# Patient Record
Sex: MALE | Race: WHITE | NOT HISPANIC OR LATINO | Employment: FULL TIME | URBAN - METROPOLITAN AREA
[De-identification: names, ages, dates, MRNs, and addresses within clinical notes are randomized per-mention and may not be internally consistent; named-entity substitution may affect disease eponyms.]

---

## 2024-05-15 ENCOUNTER — OFFICE VISIT (OUTPATIENT)
Dept: FAMILY MEDICINE CLINIC | Facility: CLINIC | Age: 39
End: 2024-05-15
Payer: MEDICAID

## 2024-05-15 VITALS
OXYGEN SATURATION: 98 % | TEMPERATURE: 98.6 F | DIASTOLIC BLOOD PRESSURE: 80 MMHG | HEIGHT: 74 IN | WEIGHT: 209.6 LBS | BODY MASS INDEX: 26.9 KG/M2 | HEART RATE: 82 BPM | SYSTOLIC BLOOD PRESSURE: 120 MMHG

## 2024-05-15 DIAGNOSIS — M79.672 LEFT FOOT PAIN: Primary | ICD-10-CM

## 2024-05-15 DIAGNOSIS — E66.3 OVERWEIGHT (BMI 25.0-29.9): ICD-10-CM

## 2024-05-15 DIAGNOSIS — Z76.89 ENCOUNTER TO ESTABLISH CARE WITH NEW DOCTOR: ICD-10-CM

## 2024-05-15 DIAGNOSIS — K31.7 GASTRIC POLYP: ICD-10-CM

## 2024-05-15 DIAGNOSIS — M25.532 PAIN IN BOTH WRISTS: ICD-10-CM

## 2024-05-15 DIAGNOSIS — Z13.220 SCREENING FOR HYPERLIPIDEMIA: ICD-10-CM

## 2024-05-15 DIAGNOSIS — M25.531 PAIN IN BOTH WRISTS: ICD-10-CM

## 2024-05-15 DIAGNOSIS — K21.9 GASTROESOPHAGEAL REFLUX DISEASE, UNSPECIFIED WHETHER ESOPHAGITIS PRESENT: ICD-10-CM

## 2024-05-15 PROCEDURE — 1160F RVW MEDS BY RX/DR IN RCRD: CPT | Performed by: FAMILY MEDICINE

## 2024-05-15 PROCEDURE — 1159F MED LIST DOCD IN RCRD: CPT | Performed by: FAMILY MEDICINE

## 2024-05-15 PROCEDURE — 1126F AMNT PAIN NOTED NONE PRSNT: CPT | Performed by: FAMILY MEDICINE

## 2024-05-15 PROCEDURE — 99204 OFFICE O/P NEW MOD 45 MIN: CPT | Performed by: FAMILY MEDICINE

## 2024-05-15 RX ORDER — IBUPROFEN 600 MG/1
600 TABLET ORAL 2 TIMES DAILY PRN
Qty: 45 TABLET | Refills: 0 | Status: SHIPPED | OUTPATIENT
Start: 2024-05-15
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
## 2024-06-18 ENCOUNTER — LAB (OUTPATIENT)
Dept: LAB | Facility: HOSPITAL | Age: 39
End: 2024-06-18
Payer: MEDICAID

## 2024-06-18 ENCOUNTER — OFFICE VISIT (OUTPATIENT)
Dept: FAMILY MEDICINE CLINIC | Facility: CLINIC | Age: 39
End: 2024-06-18
Payer: MEDICAID

## 2024-06-18 ENCOUNTER — TELEPHONE (OUTPATIENT)
Dept: GASTROENTEROLOGY | Facility: CLINIC | Age: 39
End: 2024-06-18

## 2024-06-18 VITALS
HEART RATE: 88 BPM | TEMPERATURE: 97.7 F | BODY MASS INDEX: 26.9 KG/M2 | HEIGHT: 74 IN | OXYGEN SATURATION: 98 % | DIASTOLIC BLOOD PRESSURE: 78 MMHG | WEIGHT: 209.6 LBS | SYSTOLIC BLOOD PRESSURE: 118 MMHG

## 2024-06-18 DIAGNOSIS — K21.9 GASTROESOPHAGEAL REFLUX DISEASE, UNSPECIFIED WHETHER ESOPHAGITIS PRESENT: ICD-10-CM

## 2024-06-18 DIAGNOSIS — Z13.220 SCREENING FOR HYPERLIPIDEMIA: ICD-10-CM

## 2024-06-18 DIAGNOSIS — Z76.89 ENCOUNTER TO ESTABLISH CARE WITH NEW DOCTOR: ICD-10-CM

## 2024-06-18 DIAGNOSIS — K31.7 GASTRIC POLYP: Primary | ICD-10-CM

## 2024-06-18 LAB
ALBUMIN SERPL-MCNC: 4.5 G/DL (ref 3.5–5.2)
ALBUMIN/GLOB SERPL: 1.5 G/DL
ALP SERPL-CCNC: 93 U/L (ref 39–117)
ALT SERPL W P-5'-P-CCNC: 37 U/L (ref 1–41)
ANION GAP SERPL CALCULATED.3IONS-SCNC: 9 MMOL/L (ref 5–15)
AST SERPL-CCNC: 19 U/L (ref 1–40)
BASOPHILS # BLD AUTO: 0.17 10*3/MM3 (ref 0–0.2)
BASOPHILS NFR BLD AUTO: 1.2 % (ref 0–1.5)
BILIRUB SERPL-MCNC: 0.4 MG/DL (ref 0–1.2)
BUN SERPL-MCNC: 19 MG/DL (ref 6–20)
BUN/CREAT SERPL: 21.1 (ref 7–25)
CALCIUM SPEC-SCNC: 9.5 MG/DL (ref 8.6–10.5)
CHLORIDE SERPL-SCNC: 104 MMOL/L (ref 98–107)
CHOLEST SERPL-MCNC: 173 MG/DL (ref 0–200)
CO2 SERPL-SCNC: 24 MMOL/L (ref 22–29)
CREAT SERPL-MCNC: 0.9 MG/DL (ref 0.76–1.27)
DEPRECATED RDW RBC AUTO: 38.9 FL (ref 37–54)
EGFRCR SERPLBLD CKD-EPI 2021: 112.1 ML/MIN/1.73
EOSINOPHIL # BLD AUTO: 0.48 10*3/MM3 (ref 0–0.4)
EOSINOPHIL NFR BLD AUTO: 3.5 % (ref 0.3–6.2)
ERYTHROCYTE [DISTWIDTH] IN BLOOD BY AUTOMATED COUNT: 11.7 % (ref 12.3–15.4)
GLOBULIN UR ELPH-MCNC: 3 GM/DL
GLUCOSE SERPL-MCNC: 91 MG/DL (ref 65–99)
HCT VFR BLD AUTO: 43.6 % (ref 37.5–51)
HDLC SERPL-MCNC: 22 MG/DL (ref 40–60)
HGB BLD-MCNC: 14.6 G/DL (ref 13–17.7)
IMM GRANULOCYTES # BLD AUTO: 0.49 10*3/MM3 (ref 0–0.05)
IMM GRANULOCYTES NFR BLD AUTO: 3.6 % (ref 0–0.5)
LDLC SERPL CALC-MCNC: 125 MG/DL (ref 0–100)
LDLC/HDLC SERPL: 5.55 {RATIO}
LYMPHOCYTES # BLD AUTO: 2.66 10*3/MM3 (ref 0.7–3.1)
LYMPHOCYTES NFR BLD AUTO: 19.4 % (ref 19.6–45.3)
MCH RBC QN AUTO: 30.2 PG (ref 26.6–33)
MCHC RBC AUTO-ENTMCNC: 33.5 G/DL (ref 31.5–35.7)
MCV RBC AUTO: 90.1 FL (ref 79–97)
MONOCYTES # BLD AUTO: 0.93 10*3/MM3 (ref 0.1–0.9)
MONOCYTES NFR BLD AUTO: 6.8 % (ref 5–12)
NEUTROPHILS NFR BLD AUTO: 65.5 % (ref 42.7–76)
NEUTROPHILS NFR BLD AUTO: 9.01 10*3/MM3 (ref 1.7–7)
NRBC BLD AUTO-RTO: 0 /100 WBC (ref 0–0.2)
PLATELET # BLD AUTO: 400 10*3/MM3 (ref 140–450)
PMV BLD AUTO: 9 FL (ref 6–12)
POTASSIUM SERPL-SCNC: 4.4 MMOL/L (ref 3.5–5.2)
PROT SERPL-MCNC: 7.5 G/DL (ref 6–8.5)
RBC # BLD AUTO: 4.84 10*6/MM3 (ref 4.14–5.8)
SODIUM SERPL-SCNC: 137 MMOL/L (ref 136–145)
TRIGL SERPL-MCNC: 144 MG/DL (ref 0–150)
VLDLC SERPL-MCNC: 26 MG/DL (ref 5–40)
WBC NRBC COR # BLD AUTO: 13.74 10*3/MM3 (ref 3.4–10.8)

## 2024-06-18 PROCEDURE — 1160F RVW MEDS BY RX/DR IN RCRD: CPT | Performed by: FAMILY MEDICINE

## 2024-06-18 PROCEDURE — 85025 COMPLETE CBC W/AUTO DIFF WBC: CPT

## 2024-06-18 PROCEDURE — 80061 LIPID PANEL: CPT

## 2024-06-18 PROCEDURE — 99213 OFFICE O/P EST LOW 20 MIN: CPT | Performed by: FAMILY MEDICINE

## 2024-06-18 PROCEDURE — 80053 COMPREHEN METABOLIC PANEL: CPT

## 2024-06-18 PROCEDURE — 1126F AMNT PAIN NOTED NONE PRSNT: CPT | Performed by: FAMILY MEDICINE

## 2024-06-18 PROCEDURE — 1159F MED LIST DOCD IN RCRD: CPT | Performed by: FAMILY MEDICINE

## 2024-06-18 NOTE — TELEPHONE ENCOUNTER
This patient requires an  for their appointment. Please see the  information below.     Caller: DOMO BARKSDALE  Relationship to patient: SELF  Best call back number: [phone]    Service:IPAD  Is  needs updated in registration: YES  Date of Appointment:06/21/2024  Time of Appointment:1:15  Additional notes:Swiss
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
## 2025-01-24 ENCOUNTER — OFFICE VISIT (OUTPATIENT)
Dept: FAMILY MEDICINE CLINIC | Facility: CLINIC | Age: 40
End: 2025-01-24
Payer: MEDICAID

## 2025-01-24 VITALS
OXYGEN SATURATION: 98 % | WEIGHT: 206.8 LBS | HEART RATE: 67 BPM | DIASTOLIC BLOOD PRESSURE: 86 MMHG | BODY MASS INDEX: 26.54 KG/M2 | TEMPERATURE: 98.4 F | HEIGHT: 74 IN | RESPIRATION RATE: 20 BRPM | SYSTOLIC BLOOD PRESSURE: 130 MMHG

## 2025-01-24 DIAGNOSIS — K21.9 GASTROESOPHAGEAL REFLUX DISEASE, UNSPECIFIED WHETHER ESOPHAGITIS PRESENT: ICD-10-CM

## 2025-01-24 DIAGNOSIS — K31.7 GASTRIC POLYP: Primary | ICD-10-CM

## 2025-01-24 PROCEDURE — 1159F MED LIST DOCD IN RCRD: CPT | Performed by: FAMILY MEDICINE

## 2025-01-24 PROCEDURE — 1160F RVW MEDS BY RX/DR IN RCRD: CPT | Performed by: FAMILY MEDICINE

## 2025-01-24 PROCEDURE — 99213 OFFICE O/P EST LOW 20 MIN: CPT | Performed by: FAMILY MEDICINE

## 2025-01-24 PROCEDURE — 1126F AMNT PAIN NOTED NONE PRSNT: CPT | Performed by: FAMILY MEDICINE

## 2025-01-24 NOTE — PROGRESS NOTES
"    Follow Up Office Visit      Date: 2025   Patient Name: Miriam Cornell  : 1985   MRN: [id number]     Chief Complaint:    Chief Complaint   Patient presents with    Heartburn     Heaviness after eating missing Gi appointment       History of Present Illness: Miriam Cornell is a 40 y.o. male who presents today scheduled for stomach area pain.      Lee Patel [id number]    Previously reported gastric polyp that they were monitoring.  Reported he was having yearly scopes in [place].    Reports he takes otc med for reflux and reports the medication helps.    Patient was previously referred to gastroenterology  Reports he missed appt with GI provider.      Reports he takes baking soda and water for his reflux and reports it helps      Subjective      Review of Systems:   Review of Systems   Constitutional:  Negative for chills and fever.   Gastrointestinal:  Negative for nausea and vomiting.       I have reviewed the patients family history, social history, past medical history, past surgical history and have updated it as appropriate.     Medications:     Current Outpatient Medications:     ibuprofen (ADVIL,MOTRIN) 600 MG tablet, Take 1 tablet by mouth 2 (Two) Times a Day As Needed for Mild Pain or Moderate Pain. (Patient not taking: Reported on 2025), Disp: 45 tablet, Rfl: 0    Allergies:   No Known Allergies    Objective     Physical Exam: Please see above  Vital Signs:   Vitals:    25 1043   BP: 130/86   BP Location: Left arm   Patient Position: Sitting   Cuff Size: Large Adult   Pulse: 67   Resp: 20   Temp: 98.4 °F (36.9 °C)   TempSrc: Temporal   SpO2: 98%   Weight: 93.8 kg (206 lb 12.8 oz)   Height: 187 cm (73.62\")   PainSc: 0-No pain     Body mass index is 26.82 kg/m².          Physical Exam  Vitals and nursing note reviewed.   Constitutional:       Appearance: Normal appearance.   HENT:      Head: Normocephalic and atraumatic.   Neck:      Vascular: No carotid bruit. " "  Cardiovascular:      Rate and Rhythm: Normal rate and regular rhythm.      Heart sounds: Normal heart sounds. No murmur heard.  Pulmonary:      Effort: Pulmonary effort is normal.      Breath sounds: Normal breath sounds.   Abdominal:      General: Bowel sounds are normal.      Palpations: Abdomen is soft. There is no mass.      Tenderness: There is no abdominal tenderness.   Musculoskeletal:      Right lower leg: No edema.      Left lower leg: No edema.   Skin:     Coloration: Skin is not jaundiced or pale.      Findings: No erythema.   Neurological:      Mental Status: He is alert. Mental status is at baseline.   Psychiatric:         Mood and Affect: Mood normal.         Behavior: Behavior normal.         Procedures    Results:   Labs:   No results found for: \"HGBA1C\", \"CMP\", \"CBCDIFFPANEL\", \"CREAT\", \"TSH\"     POCT Results (if applicable):   Results for orders placed or performed in visit on 06/18/24   Comprehensive Metabolic Panel    Collection Time: 06/18/24  9:34 AM    Specimen: Blood   Result Value Ref Range    Glucose 91 65 - 99 mg/dL    BUN 19 6 - 20 mg/dL    Creatinine 0.90 0.76 - 1.27 mg/dL    Sodium 137 136 - 145 mmol/L    Potassium 4.4 3.5 - 5.2 mmol/L    Chloride 104 98 - 107 mmol/L    CO2 24.0 22.0 - 29.0 mmol/L    Calcium 9.5 8.6 - 10.5 mg/dL    Total Protein 7.5 6.0 - 8.5 g/dL    Albumin 4.5 3.5 - 5.2 g/dL    ALT (SGPT) 37 1 - 41 U/L    AST (SGOT) 19 1 - 40 U/L    Alkaline Phosphatase 93 39 - 117 U/L    Total Bilirubin 0.4 0.0 - 1.2 mg/dL    Globulin 3.0 gm/dL    A/G Ratio 1.5 g/dL    BUN/Creatinine Ratio 21.1 7.0 - 25.0    Anion Gap 9.0 5.0 - 15.0 mmol/L    eGFR 112.1 >60.0 mL/min/1.73   Lipid Panel    Collection Time: 06/18/24  9:34 AM    Specimen: Blood   Result Value Ref Range    Total Cholesterol 173 0 - 200 mg/dL    Triglycerides 144 0 - 150 mg/dL    HDL Cholesterol 22 (L) 40 - 60 mg/dL    LDL Cholesterol  125 (H) 0 - 100 mg/dL    VLDL Cholesterol 26 5 - 40 mg/dL    LDL/HDL Ratio 5.55    CBC " Auto Differential    Collection Time: 06/18/24  9:34 AM    Specimen: Blood   Result Value Ref Range    WBC 13.74 (H) 3.40 - 10.80 10*3/mm3    RBC 4.84 4.14 - 5.80 10*6/mm3    Hemoglobin 14.6 13.0 - 17.7 g/dL    Hematocrit 43.6 37.5 - 51.0 %    MCV 90.1 79.0 - 97.0 fL    MCH 30.2 26.6 - 33.0 pg    MCHC 33.5 31.5 - 35.7 g/dL    RDW 11.7 (L) 12.3 - 15.4 %    RDW-SD 38.9 37.0 - 54.0 fl    MPV 9.0 6.0 - 12.0 fL    Platelets 400 140 - 450 10*3/mm3    Neutrophil % 65.5 42.7 - 76.0 %    Lymphocyte % 19.4 (L) 19.6 - 45.3 %    Monocyte % 6.8 5.0 - 12.0 %    Eosinophil % 3.5 0.3 - 6.2 %    Basophil % 1.2 0.0 - 1.5 %    Immature Grans % 3.6 (H) 0.0 - 0.5 %    Neutrophils, Absolute 9.01 (H) 1.70 - 7.00 10*3/mm3    Lymphocytes, Absolute 2.66 0.70 - 3.10 10*3/mm3    Monocytes, Absolute 0.93 (H) 0.10 - 0.90 10*3/mm3    Eosinophils, Absolute 0.48 (H) 0.00 - 0.40 10*3/mm3    Basophils, Absolute 0.17 0.00 - 0.20 10*3/mm3    Immature Grans, Absolute 0.49 (H) 0.00 - 0.05 10*3/mm3    nRBC 0.0 0.0 - 0.2 /100 WBC       PHQ-9: PHQ-9 Total Score:       Imaging:   No valid procedures specified.         Assessment / Plan      Assessment/Plan:     Patient to with gastroenterology.  With Gastroenterology.  Missed his appt.  Discussed with patient to check with  about rescheduling his appt.        1. Gastric polyp  Patient to reschedule with gastroenterology.    2. Gastroesophageal reflux disease, unspecified whether esophagitis present  Discussed taking over-the-counter medication such as Tums, or Pepcid for reflux symptoms.  Patient to schedule/reschedule with gastroenterology.        Part of this note may be an electronic transcription/translation of spoken language to printed text using the Dragon Dictation System.      Vaccine Counseling:      Follow Up:   Return in about 6 months (around 7/24/2025).      DO JAMES Rodriguez

## 2025-03-10 ENCOUNTER — OFFICE VISIT (OUTPATIENT)
Dept: GASTROENTEROLOGY | Facility: CLINIC | Age: 40
End: 2025-03-10
Payer: MEDICAID

## 2025-03-10 VITALS
HEART RATE: 73 BPM | SYSTOLIC BLOOD PRESSURE: 140 MMHG | OXYGEN SATURATION: 97 % | HEIGHT: 71 IN | BODY MASS INDEX: 29.4 KG/M2 | WEIGHT: 210 LBS | DIASTOLIC BLOOD PRESSURE: 80 MMHG

## 2025-03-10 DIAGNOSIS — R10.9 ABDOMINAL DISCOMFORT: ICD-10-CM

## 2025-03-10 DIAGNOSIS — K21.9 GASTROESOPHAGEAL REFLUX DISEASE, UNSPECIFIED WHETHER ESOPHAGITIS PRESENT: Primary | ICD-10-CM

## 2025-03-10 DIAGNOSIS — K59.04 CHRONIC IDIOPATHIC CONSTIPATION: ICD-10-CM

## 2025-03-10 PROCEDURE — 1160F RVW MEDS BY RX/DR IN RCRD: CPT | Performed by: NURSE PRACTITIONER

## 2025-03-10 PROCEDURE — 1159F MED LIST DOCD IN RCRD: CPT | Performed by: NURSE PRACTITIONER

## 2025-03-10 PROCEDURE — 99204 OFFICE O/P NEW MOD 45 MIN: CPT | Performed by: NURSE PRACTITIONER

## 2025-03-10 RX ORDER — POLYETHYLENE GLYCOL 3350 17 G/17G
17 POWDER, FOR SOLUTION ORAL DAILY
Qty: 510 G | Refills: 11 | Status: SHIPPED | OUTPATIENT
Start: 2025-03-10

## 2025-03-10 RX ORDER — OMEPRAZOLE 20 MG/1
20 CAPSULE, DELAYED RELEASE ORAL DAILY
Qty: 90 CAPSULE | Refills: 3 | Status: SHIPPED | OUTPATIENT
Start: 2025-03-10

## 2025-03-10 NOTE — PROGRESS NOTES
GASTROENTEROLOGY OFFICE NOTE    Miriam Cornell  [id number]  1985    CARE TEAM  Patient Care Team:  Vijay Ozuna DO as PCP - General (Family Medicine)    Referring Provider: No ref. provider found    Chief Complaint   Patient presents with    Heartburn     New patient    Gastric Polyp     New patient        HISTORY OF PRESENT ILLNESS:   Miriam Cornell is a 40 y.o. male who presents to the clinic today as a referral from Dr. Vijay Ozuna for evaluation regarding history of gastric polyp that was being monitored  History of Present Illness  The patient is a 40-year-old male who presents for evaluation of abdominal heaviness after eating. Croatian  [id number] via ipad, Everloop used during the appointment.     He reports a persistent sensation of heaviness in the mid-upper region of his abdomen, which has recently intensified. This discomfort is particularly noticeable after meals, a symptom he had not previously experienced. He also suffers from acid reflux, characterized by a burning sensation or the regurgitation of hot gas, typically triggered by excessive tea consumption or inadequate sleep. He has found some relief from these symptoms by consuming baking soda with hot water.     His most recent endoscopy was performed in 2021, revealing a gastric polyp. An external ultrasound was reportedly also conducted. Although these findings were not deemed immediately concerning, he was advised to monitor them regularly.      His bowel movements occur every other day or so.     He frequently consumes tea and water with lemon but does not report any associated acid reflux.     He recalls a period of smoking cessation three years ago, during which he experienced a reduction in acid reflux symptoms.      Past Medical History:   Diagnosis Date    GERD (gastroesophageal reflux disease)     Hypertension         Past Surgical History:   Procedure Laterality Date    ENDOSCOPY      Yrs ago in [place]     "    Current Outpatient Medications on File Prior to Visit   Medication Sig    ibuprofen (ADVIL,MOTRIN) 600 MG tablet Take 1 tablet by mouth 2 (Two) Times a Day As Needed for Mild Pain or Moderate Pain. (Patient not taking: Reported on 6/18/2024)     No current facility-administered medications on file prior to visit.       No Known Allergies    Family History   Problem Relation Age of Onset    Throat cancer Mother     Throat cancer Father     Colon cancer Neg Hx        Social History     Socioeconomic History    Marital status:    Tobacco Use    Smoking status: Every Day     Current packs/day: 1.00     Average packs/day: 1 pack/day for 3.2 years (3.2 ttl pk-yrs)     Types: Cigarettes     Start date: 2022     Passive exposure: Current    Smokeless tobacco: Never    Tobacco comments:     Half pack per day. x2   Vaping Use    Vaping status: Never Used    Passive vaping exposure: Yes   Substance and Sexual Activity    Alcohol use: Never    Drug use: Never    Sexual activity: Yes     Partners: Female       PHYSICAL EXAM   /80 (BP Location: Left arm, Patient Position: Sitting, Cuff Size: Adult)   Pulse 73   Ht 180 cm (70.87\")   Wt 95.3 kg (210 lb)   SpO2 97%   BMI 29.40 kg/m²   Physical Exam  Constitutional:       General: He is not in acute distress.     Appearance: He is not toxic-appearing.   HENT:      Head: Normocephalic and atraumatic. No contusion.      Right Ear: External ear normal.      Left Ear: External ear normal.   Eyes:      General: Lids are normal. No scleral icterus.        Right eye: No discharge.         Left eye: No discharge.      Extraocular Movements: Extraocular movements intact.   Neck:      Trachea: Trachea normal.      Comments: No visible mass  No visible adenopathy  Cardiovascular:      Rate and Rhythm: Normal rate.   Pulmonary:      Effort: No respiratory distress.      Comments: Symmetrical expansion    Abdominal:      Palpations: Abdomen is soft. There is no mass.      " Tenderness: There is no abdominal tenderness.   Musculoskeletal:      Comments: Symmetrical movement of upper extremities  Symmetrical movement of lower extremities  No visible deformities   Skin:     General: Skin is warm and dry.      Coloration: Skin is not jaundiced.   Neurological:      General: No focal deficit present.      Mental Status: He is alert and oriented to person, place, and time.   Psychiatric:         Mood and Affect: Mood normal.         Behavior: Behavior normal.         Thought Content: Thought content normal.     Results Review:    CMP          6/18/2024    09:34   CMP   Glucose 91    BUN 19    Creatinine 0.90    EGFR 112.1    Sodium 137    Potassium 4.4    Chloride 104    Calcium 9.5    Total Protein 7.5    Albumin 4.5    Globulin 3.0    Total Bilirubin 0.4    Alkaline Phosphatase 93    AST (SGOT) 19    ALT (SGPT) 37    Albumin/Globulin Ratio 1.5    BUN/Creatinine Ratio 21.1    Anion Gap 9.0      CBC          6/18/2024    09:34   CBC   WBC 13.74    RBC 4.84    Hemoglobin 14.6    Hematocrit 43.6    MCV 90.1    MCH 30.2    MCHC 33.5    RDW 11.7    Platelets 400        ASSESSMENT / PLAN    Assessment & Plan      1. Gastroesophageal reflux disease, unspecified whether esophagitis present  2. Chronic idiopathic constipation  3. Abdominal discomfort  - plan for EGD for additional evaluation  - recommend omeprazole daily and lifestyle modifications for possible gastroparesis  - start Miralax daily in the event having a bowel movement every other day or so is contributing to symptoms   - consider US gallbladder to evaluate for cholelithiasis or sludge in the future   - omeprazole (priLOSEC) 20 MG capsule; Take 1 capsule by mouth Daily. 30 to 60 minutes prior to a meal  Dispense: 90 capsule; Refill: 3  - polyethylene glycol (MiraLax) 17 GM/SCOOP powder; Take 17 g by mouth Daily. Mix in 8 oz of any liquid once daily  Dispense: 510 g; Refill: 11    Return for Follow-up after EGD.    Patient or patient  representative verbalized consent for the use of Ambient Listening during the visit with  JODY Gordon for chart documentation. 3/10/2025  11:14 EDT    Danitzatereso Martinezchi  reports that he has been smoking cigarettes. He started smoking about 3 years ago. He has a 3.2 pack-year smoking history. He has been exposed to tobacco smoke. He has never used smokeless tobacco.  Recommend he stop tobacco products        JODY Gordon  03/10/2025

## 2025-05-09 ENCOUNTER — LAB REQUISITION (OUTPATIENT)
Dept: LAB | Facility: HOSPITAL | Age: 40
End: 2025-05-09
Payer: MEDICAID

## 2025-05-09 ENCOUNTER — OUTSIDE FACILITY SERVICE (OUTPATIENT)
Dept: GASTROENTEROLOGY | Facility: CLINIC | Age: 40
End: 2025-05-09
Payer: MEDICAID

## 2025-05-09 DIAGNOSIS — K44.9 DIAPHRAGMATIC HERNIA WITHOUT OBSTRUCTION OR GANGRENE: ICD-10-CM

## 2025-05-09 DIAGNOSIS — R12 HEARTBURN: ICD-10-CM

## 2025-05-09 DIAGNOSIS — K21.00 GASTRO-ESOPHAGEAL REFLUX DISEASE WITH ESOPHAGITIS, WITHOUT BLEEDING: ICD-10-CM

## 2025-05-09 DIAGNOSIS — K22.89 OTHER SPECIFIED DISEASE OF ESOPHAGUS: ICD-10-CM

## 2025-05-09 PROCEDURE — 43239 EGD BIOPSY SINGLE/MULTIPLE: CPT | Performed by: INTERNAL MEDICINE

## 2025-05-09 PROCEDURE — 88305 TISSUE EXAM BY PATHOLOGIST: CPT | Performed by: INTERNAL MEDICINE

## 2025-05-12 LAB — REF LAB TEST METHOD: NORMAL

## 2025-05-15 ENCOUNTER — RESULTS FOLLOW-UP (OUTPATIENT)
Dept: LAB | Facility: HOSPITAL | Age: 40
End: 2025-05-15
Payer: MEDICAID

## 2025-05-15 DIAGNOSIS — A04.8 HELICOBACTER PYLORI INFECTION: Primary | ICD-10-CM

## 2025-05-15 NOTE — TELEPHONE ENCOUNTER
May 15, 2025    Miriam Cornell  [address] [facility] [address]      Dear Mr. Cornell:    This letter is in regards to the biopsy report of your EGD of May 9, 2025.    Biopsies from the esophagus and duodenum were unremarkable. Specifically, esophageal biopsies were negative for eosinophilic esophagitis or العراقي's esophagus.  Duodenal biopsies were negative for Celiac disease.    Biopsies from the stomach showed the presence of a bacteria known as H. pylori which is associated with chronic inflammation stomach, and increased risk for gastric ulcer and a lifetime increased risk for developing stomach cancer.  Eradication of this bacteria is recommended.  Usually a combination of antibiotics is recommended.  I am recommending a 14-day course of Voquezna 20 mg taken twice daily along with amoxicillin 1 g taken 3 times daily.  About 4 weeks after completing this regimen, a breath test to confirm eradication of the bacteria is recommended.    I do hope this letter finds you well. Please call me with any questions or concerns you may have.    Sincerely,  David Villalobos M.D.    CC: Vijay Ozuna, DO

## 2025-05-15 NOTE — LETTER
May 15, 2025    Miriam Cornell  [address] [address]      Dear Mr. Cornell:    This letter is in regards to the biopsy report of your EGD of May 9, 2025.    Biopsies from the esophagus and duodenum were unremarkable. Specifically, esophageal biopsies were negative for eosinophilic esophagitis or العراقي's esophagus.  Duodenal biopsies were negative for Celiac disease.    Biopsies from the stomach showed the presence of a bacteria known as H. pylori which is associated with chronic inflammation stomach, and increased risk for gastric ulcer and a lifetime increased risk for developing stomach cancer.  Eradication of this bacteria is recommended.  Usually a combination of antibiotics is recommended.  I am recommending a 14-day course of Voquezna 20 mg taken twice daily along with amoxicillin 1 g taken 3 times daily.  About 4 weeks after completing this regimen, a breath test to confirm eradication of the bacteria is recommended.    I do hope this letter finds you well. Please call me with any questions or concerns you may have.    Sincerely,  David Villalobos M.D.    CC: Vijay Ozuna DO  CC: JODY Fernandez

## 2025-05-30 ENCOUNTER — TELEPHONE (OUTPATIENT)
Dept: GASTROENTEROLOGY | Facility: CLINIC | Age: 40
End: 2025-05-30
Payer: MEDICAID

## 2025-05-30 NOTE — TELEPHONE ENCOUNTER
Insurance information given to [facility]. Will reach out to patient and advised them that pt will need interpeter.

## 2025-06-02 ENCOUNTER — PRIOR AUTHORIZATION (OUTPATIENT)
Dept: GASTROENTEROLOGY | Facility: CLINIC | Age: 40
End: 2025-06-02
Payer: MEDICAID

## 2025-06-09 ENCOUNTER — OFFICE VISIT (OUTPATIENT)
Dept: FAMILY MEDICINE CLINIC | Facility: CLINIC | Age: 40
End: 2025-06-09
Payer: MEDICAID

## 2025-06-09 ENCOUNTER — TELEPHONE (OUTPATIENT)
Dept: GASTROENTEROLOGY | Facility: CLINIC | Age: 40
End: 2025-06-09
Payer: MEDICAID

## 2025-06-09 VITALS
RESPIRATION RATE: 20 BRPM | SYSTOLIC BLOOD PRESSURE: 120 MMHG | OXYGEN SATURATION: 98 % | BODY MASS INDEX: 29.93 KG/M2 | HEART RATE: 76 BPM | DIASTOLIC BLOOD PRESSURE: 80 MMHG | TEMPERATURE: 98.4 F | WEIGHT: 213.8 LBS | HEIGHT: 71 IN

## 2025-06-09 DIAGNOSIS — K44.9 HIATAL HERNIA: ICD-10-CM

## 2025-06-09 DIAGNOSIS — A04.8 H. PYLORI INFECTION: ICD-10-CM

## 2025-06-09 DIAGNOSIS — K21.00 GASTROESOPHAGEAL REFLUX DISEASE WITH ESOPHAGITIS, UNSPECIFIED WHETHER HEMORRHAGE: ICD-10-CM

## 2025-06-09 DIAGNOSIS — R22.9 SUBCUTANEOUS MASS: Primary | ICD-10-CM

## 2025-06-09 PROCEDURE — 1126F AMNT PAIN NOTED NONE PRSNT: CPT | Performed by: FAMILY MEDICINE

## 2025-06-09 PROCEDURE — 1159F MED LIST DOCD IN RCRD: CPT | Performed by: FAMILY MEDICINE

## 2025-06-09 PROCEDURE — 1160F RVW MEDS BY RX/DR IN RCRD: CPT | Performed by: FAMILY MEDICINE

## 2025-06-09 PROCEDURE — 99214 OFFICE O/P EST MOD 30 MIN: CPT | Performed by: FAMILY MEDICINE

## 2025-06-09 RX ORDER — AMOXICILLIN 250 MG/1
1000 CAPSULE ORAL 3 TIMES DAILY
COMMUNITY

## 2025-06-09 RX ORDER — VONOPRAZAN FUMARATE 26.72 MG/1
20 TABLET ORAL 2 TIMES DAILY
COMMUNITY

## 2025-06-09 NOTE — PROGRESS NOTES
Follow Up Office Visit      Date: 2025   Patient Name: Miriam Cornell  : 1985   MRN: [id number]     Chief Complaint:    Chief Complaint   Patient presents with    Cyst     Is underneath skin small on forearm. Has been there for 1 to 1/2 years it is not bothering him.       History of Present Illness: Miriam Cornell is a 40 y.o. male who presents today scheduled for personal problem.    Last seen by me on 2025.      Patient was seen by gastroenterology on 3/10/2025.  Was seen for abdominal heaviness after eating.  Had reported his most recent endoscopy was performed in , revealing gastric polyp.      Plan for EGD for additional evaluation.  Was to start omeprazole daily  Also noted consider ultrasound gallbladder to evaluate Betzy lithiasis or sludge in the future.  Was prescribed MiraLAX as well  -----      Reports he has a cyst under skin on left forearm.     Present for 1-1.5 years, and not bothering him.    Has subcutaneous cyst on left forearm, right distal triceps area.  Reports the one on right triceps always been there.  Reports the cyst in left forearm has been  present for patient not sure.  Discovered it about a year ago.    Reports has another on left anterior lateral rib area that is similar to the forearm, but unable to find it in exam room today.  Later during the visit patient found the area and it is below rib area anterolateral left flank area.      [id number] Ricki Russian .            Subjective      Review of Systems:   Review of Systems   Constitutional:  Negative for chills and fever.   Gastrointestinal:  Negative for nausea and vomiting.   Skin:  Positive for skin lesions (x3).       I have reviewed the patients family history, social history, past medical history, past surgical history and have updated it as appropriate.     Medications:     Current Outpatient Medications:     omeprazole (priLOSEC) 20 MG capsule, Take 1 capsule by mouth Daily. 30 to 60  "minutes prior to a meal, Disp: 90 capsule, Rfl: 3    amoxicillin (AMOXIL) 250 MG capsule, Take 4 capsules by mouth 3 (Three) Times a Day., Disp: , Rfl:     ibuprofen (ADVIL,MOTRIN) 600 MG tablet, Take 1 tablet by mouth 2 (Two) Times a Day As Needed for Mild Pain or Moderate Pain. (Patient not taking: Reported on 6/9/2025), Disp: 45 tablet, Rfl: 0    polyethylene glycol (MiraLax) 17 GM/SCOOP powder, Take 17 g by mouth Daily. Mix in 8 oz of any liquid once daily, Disp: 510 g, Rfl: 11    Vonoprazan Fumarate (Voquezna) 20 MG tablet, Take 1 tablet by mouth 2 (Two) Times a Day., Disp: , Rfl:     Allergies:   No Known Allergies    Objective     Physical Exam: Please see above  Vital Signs:   Vitals:    06/09/25 1434   BP: 120/80   BP Location: Right arm   Patient Position: Sitting   Cuff Size: Adult   Pulse: 76   Resp: 20   Temp: 98.4 °F (36.9 °C)   TempSrc: Temporal   SpO2: 98%   Weight: 97 kg (213 lb 12.8 oz)   Height: 180 cm (70.87\")   PainSc: 0-No pain     Body mass index is 29.93 kg/m².  BMI is >= 25 and <30. (Overweight) The following options were offered after discussion;: exercise counseling/recommendations and nutrition counseling/recommendations       Physical Exam  Vitals and nursing note reviewed.   Constitutional:       Appearance: Normal appearance.   HENT:      Head: Normocephalic and atraumatic.   Neck:      Vascular: No carotid bruit.   Cardiovascular:      Rate and Rhythm: Normal rate and regular rhythm.      Heart sounds: Normal heart sounds. No murmur heard.  Pulmonary:      Effort: Pulmonary effort is normal.      Breath sounds: Normal breath sounds.   Abdominal:      General: Bowel sounds are normal.      Palpations: Abdomen is soft. There is no mass.      Tenderness: There is no abdominal tenderness.   Musculoskeletal:        Arms:       Right lower leg: No edema.      Left lower leg: No edema.   Skin:     Coloration: Skin is not jaundiced or pale.      Findings: No erythema.   Neurological:      " "Mental Status: He is alert. Mental status is at baseline.   Psychiatric:         Mood and Affect: Mood normal.         Behavior: Behavior normal.         Procedures    Results:   Labs:   No results found for: \"HGBA1C\", \"CMP\", \"CBCDIFFPANEL\", \"CREAT\", \"TSH\"     POCT Results (if applicable):   Results for orders placed or performed in visit on 25   TISSUE EXAM, [facility] ([facility])    Collection Time: 25  2:25 PM    Specimen: Tissue   Result Value Ref Range    Reference Lab Report       [facility]  [address] [place] [address]  Phone: [phone] or [phone]  Fax: [phone]  Franklin Santiago M.D., Medical Director    PATIENT NAME                           LABORATORY NO.  [address]  [address]                        [id number]  [id number]                         AGE              SEX  SSN           CLIENT REF #  [place] [id number]      1985      [id number]   [id number]    [address] [facility]              REQUESTING M.D.     ATTENDING [place] [address]                JYOTI QUEZADA WILLIAM ADALBERTO  DATE COLLECTED      DATE RECEIVED      DATE REPORTED  2025    DIAGNOSIS:  A.   DUODENUM, BIOPSY:  Overall preserved villous architecture with no significant histopathologic  change.  No parasites noted.  No increase in intraepithelial lymphocytes or significant villous blunting  suggestive of celiac disease.  B.    GASTRIC ANTRUM, BIOPSY:  Mild chronic gastritis with activity.  Abundant Helicobacter pylori-like organisms identified on routine stains.  Negative for intestinal metaplasia and dysplasia.  C.   ESOPHAGUS, BIOPSY:  Squamous mucosa with basal layer hyperplasia and mild increase in  intraepithelial leukocytes without eosinophils.  Gastric cardia type mucosa showing mild chronic gastritis with reactive  features and focal activity.  Abundant " "Helicobacter pylori-like organisms identified on routine stains.  Negative for intestinal metaplasia and dysplasia.    CLINICAL HISTORY:  Heartburn, gastro-esophageal reflux disease with esophagitis, without bleeding,  other specified disease of esophagus, diaphragmatic hernia without obstruction  or gangrene    SPECIMENS RECEIVED:  A.  DUODENUM, BIOPSY  B.  GASTRIC ANTRUM, BIOPSY  C.  ESOPHAGUS, BIOPSY    MICROSCOPIC DESCRIPTION:  A.  Tissue blocks are prepared and slides are examined microscopically on all  specimens. See diagnosis for details.  B.   Tissue blocks are prepared and slides are examined microscopically on all  specimens. See diagnosis for details.  C.  Tissue blocks are prepared and slides are examined microscopically on all  specimens. See diagnosis for details.    Professional interpretation rendered by Rob Michele M.D., KENNEDYACIRILO. at  [facility], [facility], [address] [place] [address].    GROSS DESCRIPTION:  A.  Labeled \"duodenum\", consisting of multiple pieces of tan soft tissue  measuring 1.2 x 0.6 x 0.1 cm in aggregate, submitted entirely in 1 cassette.  SOG  B.  Labeled \"stomach, gastric antrum\", consisting of 2 pieces of tan soft tissue  measuring 0.6 x 0.5 x 0.1 cm in aggregate, submitted entirely 1 cassette.  C.  Labeled \"esophagus\", consisting of 3 pieces of tan soft tissue measuring  0.8 x 0.5 x 0.2 cm in aggregate, submitted entirely in 1 cassette.    REVIEWED, DIAGNOSED AND ELECTRONICALLY  SIGNED BY:    Rob Michele M.D., F.C.A.P.  CPT CODES:  88305x3         PHQ-9: PHQ-9 Total Score:       Imaging:  No valid procedures specified.       Assessment / Plan      Assessment/Plan:           1. Subcutaneous masses.  right Triceps, left forearm, left flank.  Patient to check about covered Dermatologists to send him to for the three subcutaneous cysts/lesions of concern.    2. Gastroesophageal reflux disease with esophagitis, unspecified whether hemorrhage  Prilosec as per " Gastroenterology.    3. Hiatal hernia  Prilosec as per as above.    4. H. pylori infection  Treatment as per Gastroenterology-Voquezna and Amoxicillin course.          Discussed with patient: Take Voquezna and amoxicillin as per gastroenterology.  Our office attempting to reach Gastroenterology office to check on treatment.  Patient to check with his insurance concerning dermatology provider covered by his insurance, and let us know.      Part of this note may be an electronic transcription/translation of spoken language to printed text using the Dragon Dictation System.               Vaccine Counseling:      Follow Up:   Return in about 3 months (around 9/9/2025), or if symptoms worsen or fail to improve, for Follow Up GERD..      DO JAMES Rodriguez

## 2025-06-09 NOTE — TELEPHONE ENCOUNTER
Spoke with Patient wife via .   Pt prescription for H. Pylori treatment was sent to [facility] on 5-. Pharmacy has been unsucessful at reaching patient to schedule Shipment. Pt given phone number to pharmacy [phone]. She verbalized understanding and will call to schedule shipment.